# Patient Record
Sex: MALE | Race: WHITE
[De-identification: names, ages, dates, MRNs, and addresses within clinical notes are randomized per-mention and may not be internally consistent; named-entity substitution may affect disease eponyms.]

---

## 2018-01-13 ENCOUNTER — HOSPITAL ENCOUNTER (INPATIENT)
Dept: HOSPITAL 68 - ERH | Age: 23
LOS: 3 days | DRG: 351 | End: 2018-01-16
Attending: INTERNAL MEDICINE | Admitting: INTERNAL MEDICINE
Payer: COMMERCIAL

## 2018-01-13 DIAGNOSIS — M62.82: Primary | ICD-10-CM

## 2018-01-13 DIAGNOSIS — F20.9: ICD-10-CM

## 2018-01-13 DIAGNOSIS — Z91.14: ICD-10-CM

## 2018-01-13 DIAGNOSIS — E87.2: ICD-10-CM

## 2018-01-13 DIAGNOSIS — F12.959: ICD-10-CM

## 2018-01-13 LAB
ABSOLUTE GRANULOCYTE CT: 11.6 /CUMM (ref 1.4–6.5)
ABSOLUTE GRANULOCYTE CT: 17.4 /CUMM (ref 1.4–6.5)
BASOPHILS # BLD: 0.1 /CUMM (ref 0–0.2)
BASOPHILS # BLD: 0.1 /CUMM (ref 0–0.2)
BASOPHILS NFR BLD: 0.3 % (ref 0–2)
BASOPHILS NFR BLD: 0.4 % (ref 0–2)
EOSINOPHIL # BLD: 0 /CUMM (ref 0–0.7)
EOSINOPHIL # BLD: 0 /CUMM (ref 0–0.7)
EOSINOPHIL NFR BLD: 0 % (ref 0–5)
EOSINOPHIL NFR BLD: 0.1 % (ref 0–5)
ERYTHROCYTE [DISTWIDTH] IN BLOOD BY AUTOMATED COUNT: 12.6 % (ref 11.5–14.5)
ERYTHROCYTE [DISTWIDTH] IN BLOOD BY AUTOMATED COUNT: 12.8 % (ref 11.5–14.5)
GRANULOCYTES NFR BLD: 77.3 % (ref 42.2–75.2)
GRANULOCYTES NFR BLD: 85.5 % (ref 42.2–75.2)
HCT VFR BLD CALC: 37.5 % (ref 42–52)
HCT VFR BLD CALC: 42.6 % (ref 42–52)
LYMPHOCYTES # BLD: 1.3 /CUMM (ref 1.2–3.4)
LYMPHOCYTES # BLD: 1.7 /CUMM (ref 1.2–3.4)
MCH RBC QN AUTO: 29.7 PG (ref 27–31)
MCH RBC QN AUTO: 30 PG (ref 27–31)
MCHC RBC AUTO-ENTMCNC: 33.8 G/DL (ref 33–37)
MCHC RBC AUTO-ENTMCNC: 34.3 G/DL (ref 33–37)
MCV RBC AUTO: 87.6 FL (ref 80–94)
MCV RBC AUTO: 87.7 FL (ref 80–94)
MONOCYTES # BLD: 1.6 /CUMM (ref 0.1–0.6)
MONOCYTES # BLD: 1.6 /CUMM (ref 0.1–0.6)
PLATELET # BLD: 267 /CUMM (ref 130–400)
PLATELET # BLD: 328 /CUMM (ref 130–400)
PMV BLD AUTO: 7 FL (ref 7.4–10.4)
PMV BLD AUTO: 7.4 FL (ref 7.4–10.4)
RED BLOOD CELL CT: 4.28 /CUMM (ref 4.7–6.1)
RED BLOOD CELL CT: 4.86 /CUMM (ref 4.7–6.1)
WBC # BLD AUTO: 15 /CUMM (ref 4.8–10.8)
WBC # BLD AUTO: 20.3 /CUMM (ref 4.8–10.8)

## 2018-01-13 PROCEDURE — G0480 DRUG TEST DEF 1-7 CLASSES: HCPCS

## 2018-01-13 NOTE — ED PSYCHIATRIC COMPLAINT
History of Present Illness
 
General
Chief Complaint: Psychiatric Related Complaint
Stated Complaint: BIBA FOUND OUTSIDE IN UNDERWEAR, COMBATIVE
Source: EMS
Exam Limitations: confusion
Allergies
Coded Allergies:
tree nut (Severe, ANAPHYLAXIS 18)
 
Reconcile Medications
Risperidone (Risperdal) 1 MG TABLET   1 TAB PO DAILY MAYDA
 
Triage Nurses Notes Reviewed? yes
HPI:
22 year old male presents to the ER for agitation and confusion.  He was found 
in his underwear wandering the streets and told EMS he tried to smoke K47.   He 
appears confused, smiling, agitated, right subconjunctival hemorrhage.  He is 
unable to tell me what happened.   He also has abrasions to his right elbow and 
left 2nd toe. 
(Donny BECKETT,Amy)
 
Vital Signs & Intake/Output
Vital Signs & Intake/Output
 Vital Signs
 
 
Date Time Temp Pulse Resp B/P B/P Pulse O2 O2 Flow FiO2
 
     Mean Ox Delivery Rate 
 
 1340 97.6 84 18 137/84  100 Room Air  
 
 1114 98.0 97 18 150/89  99 Room Air  
 
 1113      99 Room Air  
 
 0900 98.0 74 18 130/77  99 Room Air  
 
 0700      99 Room Air  
 
 0700 96.9 76 18 147/81  99 Room Air  
 
 0559 98.2 105 20 170/99  100 Room Air  
 
 0020 98.8 110 18 156/82  97 Room Air  
 
 2043 98.7 108 18 167/96  100 Room Air  
 
 1833 98.2 100  146/98     
 
 1704 98.6 84 18 164/76  96 Room Air  
 
 
 ED Intake and Output
 
 
  0000  1200
 
Intake Total 3000 0
 
Output Total 1200 
 
Balance 1800 0
 
   
 
Intake, IV 3000 
 
Intake, Oral  0
 
Output, Urine 1200 
 
 
 
(Kacie BECKETT,Manjit RIDLEY)
 
Past History
 
Travel History
Traveled to [place] past 21 day No
 
Medical History
Any Pertinent Medical History? see below for history
Neurological: NONE
EENT: NONE
Cardiovascular: NONE
Respiratory: NONE
Gastrointestinal: NONE
Hepatic: NONE
Renal: NONE
Musculoskeletal: NONE
Psychiatric: anxiety, substance abuse
Endocrine: NONE
Blood Disorders: NONE
Cancer(s): NONE
GYN/Reproductive: NONE
History of MRSA: No
History of VRE: No
History of CDIFF: No
 
Surgical History
Surgical History: non-contributory
 
Psychosocial History
Who do you live with Family
What is your primary language English
 
Family History
Hx Contributory? No
(Amy Hines MD)
 
Review of Systems
 
Review of Systems
Constitutional:
Reports: see HPI (UNABLE TO OBTAIN). 
(Amy Hines MD)
 
Physical Exam
 
Physical Exam
General Appearance: well developed/nourished, alert, awake
Head: atraumatic, normal appearance
Eyes:
Bilateral: normal appearance, PERRL, EOMI. 
Ears, Nose, Throat: hearing grossly normal
Extremities: normal range of motion, RIGHT ELBOW ABRASION, LEFT TOE ABRASION
Neurological/Psychiatric: awake, alert, calm
Appearance/Memory/Insight: disheveled, impaired insight
Behavoir/Eye Contact/Speech: belligerent, uncooperative
Thoughts/Hallucinations: no apparent hallucination
Skin: intact, normal color, warm/dry
SAD PERSONS Done? UNOBTAINABLE
(Amy Hines MD)
 
Progress
Differential Diagnosis: PSYCHOSIS, SCHIZOPHRENIA, SCHIZOAFFECTIVE DISORDER
(Amy Hines MD)
Plan of Care:
 Orders
 
 
Procedure Date/time Status
 
CREATINE PHOSPHOKINASE 01/15 0600 Active
 
CBC WITHOUT DIFFERENTIAL 01/15 0600 Active
 
BASIC ELECTROLYTES PLUS BUN&CR 01/15 0600 Active
 
Regular Diet  L Active
 
Nothing by Mouth  B Complete
 
CREATINE PHOSPHOKINASE  2200 Active
 
CREATINE PHOSPHOKINASE  1600 Active
 
Restraint- Behavioral (Order)  1030 Active
 
Pathway - chart  1025 Active
 
House Staff  1025 Active
 
CREATINE PHOSPHOKINASE  1025 Complete
 
Patient Data  0804 Active
 
ED Holding Orders  0729 Active
 
Admit to inpatient  0729 Active
 
Vital Signs  0729 Active
 
Code Status  0729 Active
 
Restraint- Behavioral (Renew)  0700 Active
 
Villaseñor, Insertion/Removal/Asses  0635 Active
 
CULTURE,URINE  0635 Active
 
EKG  0635 Active
 
CULTURE,URINE  0632 Active
 
COMPREHENSIVE METABOLIC PANEL  0424 Complete
 
CREATINE PHOSPHOKINASE  0424 Complete
 
Continuous Observation Monitor  0410 Active
 
Restraint- Behavioral (Renew)  0300 Active
 
Continuous Observation Monitor  0010 Active
 
VTE Mechanical Prophylaxis   UNK Active
 
Restraint- Behavioral (Renew)  2245 Active
 
Restraint- Behavioral (Renew)  1845 Active
 
CBC WITHOUT DIFFERENTIAL  1756 Complete
 
BASIC METABOLIC PANEL  1756 Complete
 
Restraint- Behavioral (Order)  1444 Active
 
Intake & Output  0921 Active
 
 
 Current Medications
 
 
  Sig/Bianca Start time  Last
 
Medication Dose  Stop Time Status Admin
 
Acetaminophen 650 MG Q6P PRN  1030 AC 
 
(Tylenol)     
 
Sodium Chloride 1,000 ML .Q6H40M  1030 AC 
 
(Normal Saline 0.9%)     1252
 
Enoxaparin Sodium 40 MG DAILY  1023 AC 
 
(Lovenox)     
 
 
 Laboratory Tests
 
 
 
18 1113:
Creatine Kinase 5147  H
 
18 0430:
Anion Gap 19  H, Estimated GFR > 60, BUN/Creatinine Ratio 20.0, Glucose 151  H, 
Calcium 8.7, Total Bilirubin 0.9,   H, ALT 53, Alkaline Phosphatase 61, 
Creatine Kinase 6582  H, Total Protein 6.4, Albumin 4.0, Globulin 2.4, Albumin/
Globulin Ratio 1.7
 
18 1755:
Anion Gap 18  H, Estimated GFR > 60, BUN/Creatinine Ratio 26.7  H, Glucose 66, 
Calcium 8.4, CBC w Diff NO MAN DIFF REQ, RBC 4.28  L, MCV 87.7, MCH 29.7, RDW 
12.8, MPV 7.4, Gran % 77.3  H, Lymphocytes % 11.3  L, Monocytes % 10.9  H, 
Eosinophils % 0.1, Basophils % 0.4, Absolute Granulocytes 11.6  H, Absolute 
Lymphocytes 1.7, Absolute Monocytes 1.6  H, Absolute Eosinophils 0, Absolute 
Basophils 0.1, PUBS MCHC 33.8
 
18 1415:
Urine Opiates Screen < 100.00, Methadone Screen 59, Barbiturate Screen < 60, Ur 
Phencyclidine Scrn < 6.00, Amphetamines Screen < 100, U Benzodiazepines Scrn < 
85, Urine Cocaine Screen < 50, Urine Cannabis Screen 79.50  H
 Microbiology
 0635  URINE ROUT: Urine Culture - ORD
 0633  URINE ROUT: Urine Culture - RECD
 
9:08 AM
DISCUSSED CASE WITH ICU INTENSIVIST DR PEREZ WHO FEELS PATIENT CAN GO TO  WITH
A SAFETY MONITOR AS HE IS JUST RECEIVING FLUIDS, WAITING FOR PSYCHOSIS TO CLEAR.
 RESTING HR IN 70-80'S, EKG WNL.  HEAD/NECK IMAGING NEGATIVE.  WAITING FOR 
HOUSESTAFF TO EVALUATE PATIENT.
(Donny BECKETT,Amy)
Diagnostic Imaging:
Discussed w/RAD: CT Scan. 
Radiology Impression: PATIENT: ABBY MACIAS  MEDICAL RECORD NO: [id number] PRESENT
AGE: 22  PATIENT ACCOUNT NO: [id number] : 95  LOCATION: [facility] ORDERING 
PHYSICIAN: Manjit Hester MD     SERVICE DATE:  EXAM TYPE: CAT - 
CT CERV SPINE WO IV CONTRAST; CT HEAD WO IV CONTRAST EXAMINATION: NONCONTRAST 
HEAD CT NONCONTRAST CERVICAL SPINE CT INDICATION INFORMATION: Paranoid. Head 
trauma. Cervical spine trauma. COMPARISON: None TECHNIQUE: Separate noncontrast 
CT examinations of the head and cervical spine were performed. Coronal and 
sagittal images were created for each examination at the technologist 
workstation. DLP: 1055 mGy-cm FINDINGS: Head: There is no evidence of acute 
intracranial hemorrhage or territorial infarction. No abnormal mass effect or 
midline shift is seen. Gray to white matter differentiation is well preserved. 
No extra-axial fluid collections are identified. No hydrocephalus. No 
significant volume loss. There is no abnormal attenuation within the brain 
parenchyma. The osseous structures and soft tissues are normal. The mastoid air 
cells and visualized portions of the paranasal sinuses are well aerated. 
Cervical spine: There is anatomic alignment of the vertebral bodies and 
posterior elements. The atlantoaxial and atlantooccipital articulations are 
intact. Vertebral body heights and intervertebral disc spaces are maintained.  
No evidence of acute fracture. No prevertebral soft tissue swelling. Visualized 
portions of the lung apices are unremarkable. The thyroid gland is unremarkable.
IMPRESSION: 1. No acute intracranial findings. 2. Unremarkable appearance of the
cervical spine. DICTATED BY: Trey Louis MD  DATE/TIME DICTATED:18 :RAD.STEWART  DATE/TIME TRANSCRIBED:18 
CONFIDENTIAL, DO NOT COPY WITHOUT APPROPRIATE AUTHORIZATION.  <Electronically 
signed in Other Vendor System>                                                  
                                     SIGNED BY: Trey Louis MD 18 
0628
Comments:
2018 19:45:03 PM patient signed out to me by Dr. Hines at shift change 
over.  Patient resting comfortably.
 
2018 11:10:42 PM patient continues to show signs of hallucinations and 
delusions.
 
2018 4:18:29 AM patient continues to yell and show signs of severe 
agitation despite repeated doses of Zyprexa and other sedatives.
 
2018 5:54:31 AM patient's CK has elevated although fortunately his renal 
functions remains stable.  His acidosis has improved some.  Given his 
persistently agitated state I have ordered ketamine to facilitate a CAT scan of 
the head to rule out intracranial pathology or trauma and to facilitate IV 
fluids.  I feel the patient should be admitted given his worsening 
rhabdomyolysis.
 
2018 7:33:04 AM patient signed out to Dr. Hines at shift change over.  
Patient's head CAT scan reveals no acute abnormality.  Villaseñor catheter has been 
placed to facilitate intake and output recordings.  IV fluids have been ordered 
to maintain renal perfusion.
(Kacie BECKETT,Manjit RIDLEY)
 
Departure
 
Departure
Time of Disposition: 729
Disposition: STILL A PATIENT
Condition: Stable
Clinical Impression
Primary Impression: Rhabdomyolysis
Secondary Impressions: Agitation, Psychosis
Referrals:
Patient Has No Primary Care Dr (PCP/Family)
 
Departure Forms:
Customer Survey
General Discharge Information
 
Admission Note
Spoke With:
Noe Mendes MD
Documentation of Exam:
Documentation of any treatments & extenuating circumstances including Concerns 
Regarding Discharge (functional status, medication knowledge or non-compliance, 
living conditions, etc.) that warrant an admission rather than observation: [
RESTRAINTS, SEDATION, IV FLUIDS, RECHECK ELECTROLYTES, RECHECK CK, 1:1 SITTER, 
CRISIS EVALUATION WHEN MEDICALLY CLEARED, CASE DISCUSSED WITH DR PEREZ]
 
(Donny BECKETT,Amy)

## 2018-01-14 VITALS — SYSTOLIC BLOOD PRESSURE: 152 MMHG | DIASTOLIC BLOOD PRESSURE: 84 MMHG

## 2018-01-14 VITALS — DIASTOLIC BLOOD PRESSURE: 80 MMHG | SYSTOLIC BLOOD PRESSURE: 140 MMHG

## 2018-01-14 NOTE — RADIOLOGY REPORT
EXAMINATION:
XR PORTABLE CHEST
 
CLINICAL INFORMATION:
Altered mental status. Medication overdose. Evaluate for aspiration
pneumonitis.
 
COMPARISON:
None
 
TECHNIQUE:
Portable frontal view of the chest was obtained.
 
FINDINGS:
The lungs are well-inflated and clear. Trachea is midline in position.  No
evidence of interstitial lung disease, focal consolidation, mass or pleural
effusion.
 
The cardiomediastinal silhouette and hilar structures have normal size and
contour.
 
Bones of the thorax are unremarkable. The visualized upper abdomen is normal.
 
IMPRESSION:
No evidence of aspiration pneumonia.

## 2018-01-14 NOTE — HISTORY & PHYSICAL
Kavita BECKETT,[place] 01/14/18 0951:
General Information and HPI
MD Statement:
I have seen and personally examined ABBY MACIAS and documented this H&P.
 
The patient is a 22 year old M who presented with a patient stated chief 
complaint of [Agitation, confusion, psychotic behavior].
 
Source of Information: EMS, ER notes, father
Exam Limitations: unable to give history, confusion
History of Present Illness:
22 year old male with a remote history of schizophrenia not on medications 
presents to the ER for agitation and confusion. He was found in his underwear 
wandering the streets and told EMS he tried to smoke K47 (also known as pot-
pouri or spice on the streets). He appeared confused and was severely agitated 
requiring restraints in the ER. He is unable to contribute to the history. 
According to information gathered by ER staff, his father says he had become 
very stressed recently; had bought an expensive car but was having trouble 
making the payments. He lives with his parents, has a job and has been 
functioning well despite his history of schizophrenia without much issues until 
recently. It is unclear if he smokes, drinks alcohol or uses any other illicit 
medications.
 
In the ER he had to be medicated with ketamine to get a Head CT due to severe 
agitation not amenable to other medications.
 
Allergies/Medications
Allergies:
Coded Allergies:
tree nut (Severe, ANAPHYLAXIS 01/13/18)
 
Home Med list
Risperidone (Risperdal) 1 MG TABLET   1 TAB PO DAILY MAYDA
 
Compliance With Home Meds: UNKNOWN
 
Past History
 
Travel History
Traveled to [place] past 21 day No
 
Medical History
Neurological: NONE
EENT: NONE
Cardiovascular: NONE
Respiratory: NONE
Gastrointestinal: NONE
Hepatic: NONE
Renal: NONE
Musculoskeletal: NONE
Psychiatric: anxiety, substance abuse
Endocrine: NONE
Blood Disorders: NONE
Cancer(s): NONE
GYN/Reproductive: NONE
History of MRSA: No
History of VRE: No
History of CDIFF: No
Isolation History: Standard
 
Surgical History
Surgical History: non-contributory
 
Past Family/Social History
 
Psychosocial History
Where do you live? Home
Who Do You Live With? parent
Smoking Status: Unknown If Ever Smoked
ETOH Use: unknown
Illicit Drug Use: marijuana
 
Review of Systems
 
Review of Systems
Constitutional:
Reports: no symptoms. 
Comments
pt unable to participate in history due to AMS
 
Exam & Diagnostic Data
Last 24 Hrs of Vital Signs/I&O
 Vital Signs
 
 
Date Time Temp Pulse Resp B/P B/P Pulse O2 O2 Flow FiO2
 
     Mean Ox Delivery Rate 
 
01/14 1429  86  139/86     
 
01/14 1340 97.6 84 18 137/84  100 Room Air  
 
01/14 1114 98.0 97 18 150/89  99 Room Air  
 
01/14 1113      99 Room Air  
 
01/14 0900 98.0 74 18 130/77  99 Room Air  
 
01/14 0700      99 Room Air  
 
01/14 0700 96.9 76 18 147/81  99 Room Air  
 
01/14 0559 98.2 105 20 170/99  100 Room Air  
 
01/14 0020 98.8 110 18 156/82  97 Room Air  
 
01/13 2043 98.7 108 18 167/96  100 Room Air  
 
01/13 1833 98.2 100  146/98     
 
01/13 1704 98.6 84 18 164/76  96 Room Air  
 
 
 Intake & Output
 
 
 01/14 1600 01/14 0800 01/14 0000
 
Intake Total   3000
 
Output Total  600 1200
 
Balance  -600 1800
 
    
 
Intake, IV   3000
 
Output, Urine  600 1200
 
 
 
 
Physical Exam
General Appearance Lethargic and drowsy but arousable with voice and painful 
stimuli, confused
Skin bruise over left 2nd toes and erthematous rash over bilateral knees
Skin Temp/Moisture Exam: Warm/Dry
Sepsis Skin Exam (color): Normal for Ethnicity
HEENT Mucous Membr. moist/pink, small but reactive pupils, right conjunctival 
hemorrhage
Neck Supple, No JVD
Cardiovascular Normal S1, Normal S2, with missed beats
Lungs Clear to Auscultation, Normal Air Movement
Abdomen Normal Bowel Sounds, Soft, No Tenderness
Extremities No Edema, Normal Pulses
Last 24 Hrs of Labs/Wm:
 Laboratory Tests
 
01/14/18 1113:
Creatine Kinase 5147  H
 
01/14/18 0430:
Anion Gap 19  H, Estimated GFR > 60, BUN/Creatinine Ratio 20.0, Glucose 151  H, 
Calcium 8.7, Total Bilirubin 0.9,   H, ALT 53, Alkaline Phosphatase 61, 
Creatine Kinase 6582  H, Total Protein 6.4, Albumin 4.0, Globulin 2.4, Albumin/
Globulin Ratio 1.7
 
01/13/18 1755:
Anion Gap 18  H, Estimated GFR > 60, BUN/Creatinine Ratio 26.7  H, Glucose 66, 
Calcium 8.4, CBC w Diff NO MAN DIFF REQ, RBC 4.28  L, MCV 87.7, MCH 29.7, RDW 
12.8, MPV 7.4, Gran % 77.3  H, Lymphocytes % 11.3  L, Monocytes % 10.9  H, 
Eosinophils % 0.1, Basophils % 0.4, Absolute Granulocytes 11.6  H, Absolute 
Lymphocytes 1.7, Absolute Monocytes 1.6  H, Absolute Eosinophils 0, Absolute 
Basophils 0.1, PUBS MCHC 33.8
 Microbiology
01/14 0635  URINE ROUT: Urine Culture - CAN
                Cancelled: DUPLICATE - SEE 
01/14 0633  URINE ROUT: Urine Culture - RECD
 
 
 
Diagnostic Data
EKG Results
SR-90; inferior q waves, non-specific t-wave abnormalities
 
Assessment/Plan
Assessment:
22 year old male with a remote history of schizophrenia not on medications 
presents to the ER for agitation and confusion. He was found in his underwear 
wandering the streets and told EMS he tried to smoke K47 (also known as pot-
pouri or spice on the streets). He appeared confused and was severely agitated 
requiring restraints in the ER. 
 
 
1. Psychosis with AMS likely from sedation
2. Rhabdomyolysis-eleveted CK possibly from severe agitation
3. Remote hx of schizophrenia
 
Plan
1. Admit to GM
2. IVF hydration with D5 1/2 NS
3. Keep NPO for now
4. 1:1 sitter
5. trend CK Q6 hrs
6. CXR to rule aspiration pnuemonitis
7. Aspiration precaution
8. Psych consult in AM
9. Follow attending recommendations
10. DVT ppx-with lovenox
11. FC
12. Mild pain pathway; avoid opiods/sedatives
 
As Ranked By This Provider
Problem List:
 1. Psychosis
 
 2. Rhabdomyolysis
 
 
Core Measures/Misc (9/17)
 
Acute Coronary Syndrome
ACS Diagnosis: No
 
Congestive Heart Failure
Congestive Heart Failure Diagnosis No
 
Cerebrovascular Accident
CVA/TIA Diagnosis: No
 
VTE (View Protocol)
VTE Risk Factors Acute Medical Illness
No Mechanical VTE Prophylaxis d/t N/A MechProphylax Ordered
No VTE Pharm Prophylaxis d/t NA PharmProphylax ordered
 
Sepsis (View protocol)
Sepsis Present: No
 
Resident Review Statement
Resident Statement: examined this patient
Other Findings:
see KIA Mendes MD,Noe 01/14/18 1406:
Attending MD Review Statement
 
Attending Statement
Attending MD Statement: examined this patient, discuss w/resident/PA/NP, agreed 
w/resident/PA/NP, reviewed EMR data (avail), discussed with nursing, discussed 
with case mgmt, amended to note
Attending Assessment/Plan:
Patient seen and examined.  History mostly obtained from ER staff.  He is a 22-
year-old male with history of schizophrenia reports of poor compliance with 
therapy was brought in after he was found wandering the streets in his 
underwear.  He had reportedly smoked a medication called K47.  Is unclear what 
this medication is.  Staff report that it is synthetic marijuana. An online 
search suggests it may be a combination of cocaine and ketamine.  His urine 
toxicology was positive only for marijuana which he apparently smokes as well.
He was brought into the emergency room for evaluation yesterday morning.  He was
hallucinating and delirious and required several doses of sedatives.  Yesterday 
in the emergency room he got a total of Ativan 6 mg IV, Benadryl 100 mg Haldol 
10 mg and Zyprexa 10 mg.  This morning he was again agitated and received 
Zyprexa 10 mg as well as 50 mg of ketamine.  He was restrained on 4 points.
He is found with rhabdomyolysis with CPK level of over 6000 yesterday.  Levels 
had improved to 5147 at which point he was referred to the inpatient medical 
service for further management of his rhabdomyolysis in addition to his 
psychosis.
 
 
General appearance: Very sedated but responsive.
HEENT: Anicteric, no pallor right conjunctiva hemorrhage.
Neurologic: Four-point restraints in place.  Answers questions appropriately 
when awake but falls back asleep.  Able to protect airway.  Pupils equal and 
reactive.  No nystagmus.  Extraocular muscles intact.
Heart: S1-S2 regular
Lungs: Good entry bilaterally, clear to auscultation
Abdomen: Flat, soft, nontender with normal bowel sounds
Extremities: No pedal edema
 
Head CT today shows no acute pathologies.
 
Problems:
1.  Acute medication induced psychosis.;  In patient with underlying 
schizophrenia and poorly compliant with therapy.
2. Rhabdomyolysis 
3.  Leukocytosis; likely reactive
4.  Anion gap acidosis; likely secondary to starvation 
 
plan:.
-Admit to inpatient General medical service.
-Aggressive IV hydration with D5 half normal saline at 1 25 cc/h.  2 L.
-Psychiatric consultation.
-Close monitoring for de-escalation of restraints per nursing protocol.-
-Maintain aspiration precautions at all times.  Hold all sedatives when patient 
is drowsy.
-Keep n.p.o. until patient is significantly more alert.
-No clinical evidence of infection are present.  Recommend obtaining chest x-ray
to rule out aspiration pneumonitis.
-DVT prophylaxis with heparin subcu.

## 2018-01-15 VITALS — SYSTOLIC BLOOD PRESSURE: 140 MMHG | DIASTOLIC BLOOD PRESSURE: 60 MMHG

## 2018-01-15 VITALS — DIASTOLIC BLOOD PRESSURE: 70 MMHG | SYSTOLIC BLOOD PRESSURE: 152 MMHG

## 2018-01-15 VITALS — SYSTOLIC BLOOD PRESSURE: 144 MMHG | DIASTOLIC BLOOD PRESSURE: 70 MMHG

## 2018-01-15 LAB
ABSOLUTE GRANULOCYTE CT: 5.8 /CUMM (ref 1.4–6.5)
BASOPHILS # BLD: 0 /CUMM (ref 0–0.2)
BASOPHILS NFR BLD: 0.5 % (ref 0–2)
EOSINOPHIL # BLD: 0.1 /CUMM (ref 0–0.7)
EOSINOPHIL NFR BLD: 1.6 % (ref 0–5)
ERYTHROCYTE [DISTWIDTH] IN BLOOD BY AUTOMATED COUNT: 13.2 % (ref 11.5–14.5)
GRANULOCYTES NFR BLD: 65.2 % (ref 42.2–75.2)
HCT VFR BLD CALC: 38.1 % (ref 42–52)
LYMPHOCYTES # BLD: 2 /CUMM (ref 1.2–3.4)
MCH RBC QN AUTO: 30.3 PG (ref 27–31)
MCHC RBC AUTO-ENTMCNC: 34.5 G/DL (ref 33–37)
MCV RBC AUTO: 87.9 FL (ref 80–94)
MONOCYTES # BLD: 0.9 /CUMM (ref 0.1–0.6)
PLATELET # BLD: 276 /CUMM (ref 130–400)
PMV BLD AUTO: 7.5 FL (ref 7.4–10.4)
RED BLOOD CELL CT: 4.34 /CUMM (ref 4.7–6.1)
WBC # BLD AUTO: 8.8 /CUMM (ref 4.8–10.8)
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
## 2018-01-15 NOTE — PN- HOUSESTAFF
**See Addendum**
Subjective
Follow-up For:
Rhabdomyolysis
Subjective:
Patient was seen and examined, sitter at bedside, vitals are stable. No 
complaints. Alert, orinated x 3. denies pain. Has laceration of the right elbow 
and sub-conjunctiva hemorrhage of the right eye however no pain and vision is 
intact. 
 
Review of Systems
Constitutional:
Reports: see HPI. 
 
Objective
Last 24 Hrs of Vital Signs/I&O
 Vital Signs
 
 
Date Time Temp Pulse Resp B/P B/P Pulse O2 O2 Flow FiO2
 
     Mean Ox Delivery Rate 
 
01/15 0630 98.1 91 18 144/70  97 Room Air  
 
 2247 99.1 106 19 152/84  98 Room Air  
 
 1604 98.0 70 20 140/80  98   
 
 1429  86  139/86     
 
 1340 97.6 84 18 137/84  100 Room Air  
 
 1114 98.0 97 18 150/89  99 Room Air  
 
 1113      99 Room Air  
 
 
 Intake & Output
 
 
 01/15 1600 01/15 0800 01/15 0000
 
Intake Total  900 800
 
Output Total  800 750
 
Balance  100 50
 
    
 
Intake, IV  800 400
 
Intake, Oral  100 400
 
Number  0 
 
Bowel   
 
Movements   
 
Output, Urine  800 750
 
 
 
 
Physical Exam
General Appearance: Alert, Oriented X3, Cooperative, No Acute Distress
Skin: No Rashes, No Breakdown, No Significant Lesion
Skin Temp/Moisture Exam: Warm/Dry
HEENT: Atraumatic, PERRLA, EOMI, Mucous Membr. moist/pink
Neck: Supple
Cardiovascular: Regular Rate, Normal S1, Normal S2, No Murmurs
Lungs: Clear to Auscultation, Normal Air Movement
Abdomen: Normal Bowel Sounds, Soft, No Tenderness
Neurological: Normal Speech, Strength at 5/5 X4 Ext, Normal Tone, Sensation 
Intact, Cranial Nerves 3-12 NL, Reflexes 2+
Extremities: No Clubbing, No Cyanosis, No Edema, Normal Pulses
 
Assessment/Plan
Assessment:
22 year old male with a remote history of schizophrenia not on medications 
presents to the ER for agitation and confusion. He was found in his underwear 
wandering the streets and told EMS he tried to smoke K47 (also known as pot-
pouri or spice on the streets). He appeared confused and was severely agitated 
requiring restraints in the ER. 
 
 
1. Psychosis with AMS likely from sedation
2. Rhabdomyolysis-eleveted CK possibly from severe agitation
3. Remote hx of schizophrenia
 
Plan
1.  Continue IV hydration with 1 25 mL/h
2.  Will keep patient nothing by mouth since he still drowsy, and we will assess
later in the day and to start diet if appropriate
3. 1:1 sitter
4.  Keep the Villaseñor catheter for now
5. CK is improving 2264 today, will repeat in a.m.
6. CXR was obtained to rule out aspiration pneumonia since the patient presented
with white blood cell 20 however when down to 8.8 mostly reactive
7.  Urine culture remained negative
8. Psych consult in AM
9. Follow attending recommendations pending 
10. DVT ppx-with lovenox
11. FC
12. Mild pain pathway; avoid opiods/sedatives
 
Problem List:
 1. Rhabdomyolysis
 
Pain Ratin
Pain Location:
N/A
Pain Goal: Pain 4 or less
Pain Plan:
See medication 
Tomorrow's Labs & Rationales:
CK

## 2018-01-16 VITALS — SYSTOLIC BLOOD PRESSURE: 128 MMHG | DIASTOLIC BLOOD PRESSURE: 90 MMHG

## 2018-01-16 VITALS — DIASTOLIC BLOOD PRESSURE: 70 MMHG | SYSTOLIC BLOOD PRESSURE: 114 MMHG

## 2018-01-16 NOTE — INCDNTL NT PSY
Incidental Note
Notation:
The patient has an intale appointment for psychiatry and counseling at [facility], 
[address] [place] [address], [phone], on Monday, 1/22/18, at 9:30 AM.

## 2018-01-16 NOTE — PN- PSYCHIATRY
Assessment/Plan
Impression:
Thepatient millyins some rigid thinking about psychiatric treatment for his 
anxiety. He wants to be assured that he will be assigned a prescriber who will 
give him what he wants, namely Xanax. He has been getting this from a friend in 
the past. He is not currently open to first-line medications, such as SSRI, and 
he is not willing to admit he has a cannabis use problem.
 
He is agreeable to an intake appointment with [facility]; we are awaiting a return 
call.
 
TC with his mother, Sirisha, today: She visited last night and thinks that the 
patient is at his baseline, and is willing to take him back home. She is in 
agreement that the patient should be in a program. she mentions that she is 
trying to get him an appointment at [facility] ([facility]). I 
disclosed to her (and the patient a few minutes later) that I am employed at 
[facility] [employer] part-time, but that would not prevent the patient from coming there, and if
he does not wish to see me, there are other providers.
 
The patient agreed to meet with myself and the [facility] medical student for 
the interview today. After the interview, he stated that he only wished to meet 
alone with this writer in the future, here in the hospital.
Suggestion:
1. We will advise the patient of his intake appointment at [facility] for therapy 
and medication management for anxiety and substance use disorder. We are 
expecting a return call.
 
2. Instruct the patient to avoid alcohol, cannabis, synthetic marijuana and 
other street/recreational drugs.
 
The patient is not suicidal, not psychotic, not delirious, and is cleared 
psychiatrically for discharge.
 
Signed THOM releases for [facility] and the patient's parents are in the chart.
 
Thank you for this consult. Psychiatry is signing off, but will advise patient 
of his intake appointment.
 
 
Subjective
Subjective:
Alert and oriented. Denies AH or VH, and presents no alis delusions. He denies 
SI or HI. Insight into his condition, including drug use and anxiety, possibly 
substance-induced, is limited. Judgement is moderate. He reportshe feels safe 
here.
 
Objective
Last 24 Hrs of Vital Signs/I&O
 Vital Signs
 
 
Date Time Temp Pulse Resp B/P B/P Pulse O2 O2 Flow FiO2
 
     Mean Ox Delivery Rate 
 
01/16 0619 98.2 86 20 114/70  97 Room Air  
 
01/15 2240 98.9 82 18 152/70  95   
 
01/15 1426 98.0 93 18 140/60  97 Room Air  
 
 
 Intake & Output
 
 
 01/16 1600 01/16 0800 01/16 0000
 
Intake Total  1600 980
 
Output Total   
 
Balance  1600 980
 
    
 
Intake, IV  1000 500
 
Intake, Oral  600 480
 
 
 
Physical Exam:
Not performed.
 
Physical Exam
General Appearance: alert, awake
Neurologic/Psychiatric: alert, oriented x 3
Current Medications:
 Current Medications
 
 
  Sig/Bianca Start time  Last
 
Medication Dose Route Stop Time Status Admin
 
Acetaminophen 650 MG Q6P PRN 01/14 1030 AC 01/15
 
  PO   0246
 
Dextrose/Lactated  1,000 ML Q8H 01/14 1615 DC 01/16
 
Ringer's  IV   0204
 
Enoxaparin Sodium 40 MG DAILY 01/14 1023 AC 01/16
 
  SC   0752
 
Ramelteon 8 MG .STK-MED ONE 01/16 0011 DC 
 
  PO 01/16 0012  
 
Ramelteon 8 MG ONCE ONE 01/15 2345 DC 01/16
 
  PO 01/15 2346  0012
 
 
 
 
Results
Last 24 Hrs of Labs/Mics:
 Laboratory Tests
 
 
 01/16
 
 0819
 
Chemistry 
 
  Sodium (137 - 145 mmol/L) 144
 
  Potassium (3.5 - 5.1 mmol/L) 4.0
 
  Chloride (98 - 107 mmol/L) 106
 
  Carbon Dioxide (22 - 30 mmol/L) 27
 
  Anion Gap (5 - 16) 11
 
  BUN (9 - 20 mg/dL) 7  L
 
  Creatinine (0.7 - 1.2 mg/dL) 0.8
 
  Estimated GFR (>60 ml/min) > 60
 
  BUN/Creatinine Ratio (7 - 25 %) 8.8
 
  Creatine Kinase (55 - 170 U/L) 1181  H

## 2018-01-16 NOTE — DISCHARGE SUMMARY
Visit Information
 
Visit Dates
Admission Date:
01/14/18
 
Discharge Date:
01/16/18
 
 
Hospital Course
 
Course
Attending Physician:
Cecilia Torre MD
 
Primary Care Physician:
Patient Has No Primary Care Dr
 
Hospital Course:
Mr. Yeung is 22 year old male with past medical history significant for 
anxiety, psychosis and polysubstance abuse who presented to ED after he was 
found wandering the streets in his underwear.  He had reportedly smoked a 
medication called K47 which is either ketamine and cocaine combination or 
synthetic marijuana.  CT head and cervical spine was unremarkable, chest x-ray 
within normal findings.
Urine toxicology was positive only for marijuana.  Patient was initially 
hallucinating and delirious which required multiple doses of sedatives: Ativan, 
Haldol and Benadryl in addition to 10 mg of zepraxa and 50 mg of ketamine and 
finally he was restrained in 4 points heart strain.
Patient was found to have rhabdomyolysis with CPK level over 6000, was started 
on aggressive IV fluid resuscitation, creatinine kinase was measured daily and 
improved to 1000 on discharge.  Patient denied any body pain, chest pain, 
shortness of breath, palpitation.  Kidney function remained normal.  He also was
found to have anion gap acidosis secondary to starvation that improved with 
fluid resuscitation.
Patient was discharged after medical and psychiatric clearance.  He is supposed 
to follow up with psychiatric outpatient on Monday 1/22/18. 
[facility], [address] [place] [address], [phone], on Monday, 1/22/18, at 9:
30 AM.
 
 
 
Allergies:
Coded Allergies:
tree nut (Severe, ANAPHYLAXIS 01/13/18)
 
 
Disposition Summary
 
Disposition
Principal Diagnosis:
Rhabdomyolysis
Additional Diagnosis:
Drug induced psychosis
Discharge Disposition: home or self care
 
Discharge Instructions
 
General Discharge Information
Code Status: Full Code
Patient's Diet:
Regular
Patient's Activity:
As tolerated
Follow-Up Instructions/Appts:
-Please follow up with your PCP within one week after discharge.
-Please follow up with your appointment at [facility] for therapy and medication 
management for anxiety and substance use disorder
Monday 1/22/18 at 9:30 in am and address is [address] [place] [address] 
[phone]
 
-Please avoid alcohol, cannabis, synthetic marijuana and other street/
recreational drugs.
 
Copies To:
Miya BECKETT,Markell Grove MD Review Statement
Other Findings:
Patient was discussed with psychiatry and was cleared from psych stand for 
discharge home.

## 2018-01-16 NOTE — PATIENT DISCHARGE INSTRUCTIONS
**See Addendum**
Discharge Instructions
 
General Discharge Information
Special Instructions:
-Please follow up with your PCP within one week after discharge.
-Please follow up with your appointment at [facility] for therapy and medication 
management for anxiety and substance use disorder.  
-Please avoid alcohol, cannabis, synthetic marijuana and other street/
recreational drugs.
 
Acute Coronary Syndrome
 
Inclusion Criteria
At DC or during hospital stay patient has or had the following:
ACS DIAGNOSIS No
 
Discharge Core Measures
Meds if any: Prescribed or Continued at Discharge
Meds if any: NOT Prescribed or Continued at Discharge
 
Congestive Heart Failure
 
Inclusion Criteria
At DC or during hospital stay patient has or had the following:
CHF DIAGNOSIS No
 
Discharge Core Measures
Meds if any: Prescribed or Continued at Discharge
Meds if any: NOT Prescribed or Continued at Discharge
 
Cerebrovascular accident
 
Inclusion Criteria
At DC or during hospital stay patient has or had the following:
CVA/TIA Diagnosis No
 
Discharge Core Measures
Meds if any: Prescribed or Continued at Discharge
Meds if any: NOT Prescribed or Continued at Discharge
 
Venous thromboembolism
 
Inclusion Criteria
VTE Diagnosis No
VTE Type NONE
VTE Confirmed by (Test) NONE
 

## 2018-01-16 NOTE — PN- HOUSESTAFF
Lio BECKETT,[facility] 18 0844:
Subjective
Follow-up For:
Rhabdomyolysis
Subjective:
Patient was seen and examined this morning, vitals stable. Patient is more awake
today. I asked him if he has any pain, he reported "pain in my arm", "I do not 
know where, I think it is gone". Kinf of confused with pressure talk. No 
overnight reports for agitation. Was evaluated by psych yesterday.
Patient wants to be discharged today, he was told to wait for psych 
recommendation. 
 
 
Review of Systems
Constitutional:
Reports: see HPI. 
 
Objective
Last 24 Hrs of Vital Signs/I&O
 Vital Signs
 
 
Date Time Temp Pulse Resp B/P B/P Pulse O2 O2 Flow FiO2
 
     Mean Ox Delivery Rate 
 
 0619 98.2 86 20 114/70  97 Room Air  
 
01/15 2240 98.9 82 18 152/70  95   
 
01/15 1426 98.0 93 18 140/60  97 Room Air  
 
 
 Intake & Output
 
 
  1600  0800  0000
 
Intake Total  1600 980
 
Output Total   
 
Balance  1600 980
 
    
 
Intake, IV  1000 500
 
Intake, Oral  600 480
 
 
 
 
Physical Exam
General Appearance: Alert, Cooperative, No Acute Distress
Skin: laceration over the right elbow 
Skin Temp/Moisture Exam: Warm/Dry
HEENT: Atraumatic, PERRLA, EOMI, Mucous Membr. moist/pink
Neck: Supple
Cardiovascular: Regular Rate, Normal S1, Normal S2, No Murmurs
Lungs: Clear to Auscultation, Normal Air Movement
Abdomen: Normal Bowel Sounds, Soft, No Tenderness
Neurological: Normal Speech, Strength at 5/5 X4 Ext, Normal Tone, Sensation 
Intact, Cranial Nerves 3-12 NL, Reflexes 2+
Extremities: No Clubbing, No Cyanosis, No Edema, Normal Pulses
 
Assessment/Plan
Assessment:
22 year old male with a remote history of schizophrenia not on medications 
presents to the ER for agitation and confusion. He was found in his underwear 
wandering the streets and told EMS he tried to smoke K47 (also known as pot-
pouri or spice on the streets). He appeared confused and was severely agitated 
requiring restraints in the ER. 
 
 
1. Psychosis with AMS likely from sedation
2. Rhabdomyolysis-eleveted CK possibly from severe agitation
3. Remote hx of schizophrenia
 
Plan
1.  Continue IV hydration with 1 25 mL/h
2.  Psych evaluation was obtained yesterday with recommendation to keep him in 
hospital, they will offer me inpatient admission today.
3. 1:1 sitter
4.  Villaseñor catheter removed yesterday, diet was started as well
5. CK is improving gradually, today level is pending
6. CXR was obtained to rule out aspiration pneumonia since the patient presented
with white blood cell 20 however when down to 8.8 mostly reactive
7.  Urine culture remained negative
 
DVT ppx-with lovenox
FC
Diet regular
Mild pain pathway; avoid opiods/sedatives
 
Problem List:
 1. Rhabdomyolysis
 
Pain Ratin
Pain Location:
n/a
Pain Goal: Pain 4 or less
Pain Plan:
see medication 
Tomorrow's Labs & Rationales:
EZRA Torre MD,Cecilia 18 1229:
Attending MD Review Statement
 
Attending Statement
Attending MD Statement: examined this patient, discuss w/resident/PA/NP, agreed 
w/resident/PA/NP, reviewed EMR data (avail), discussed with nursing, discussed 
with case mgmt, amended to note
Attending Assessment/Plan:
Patient seen and examined, more awake today and feels overall better. CK levels 
are coming down. 
Vital Signs
 
 
Date Time Temp Pulse Resp B/P B/P Pulse O2 O2 Flow FiO2
 
     Mean Ox Delivery Rate 
 
 0619 98.2 86 20 114/70  97 Room Air  
 
01/15 2240 98.9 82 18 152/70  95   
 
01/15 1426 98.0 93 18 140/60  97 Room Air  
 
 
on exam; 
aox3, nad. 
cv; s1,s2, rrr
resp; clear
abd; soft, nt, bs+
ext; no edema. 
 
 Laboratory Tests
 
 
 
 
 0819
 
Chemistry 
 
  Sodium (137 - 145 mmol/L) 144
 
  Potassium (3.5 - 5.1 mmol/L) 4.0
 
  Chloride (98 - 107 mmol/L) 106
 
  Carbon Dioxide (22 - 30 mmol/L) 27
 
  Anion Gap (5 - 16) 11
 
  BUN (9 - 20 mg/dL) 7  L
 
  Creatinine (0.7 - 1.2 mg/dL) 0.8
 
  Estimated GFR (>60 ml/min) > 60
 
  BUN/Creatinine Ratio (7 - 25 %) 8.8
 
  Creatine Kinase (55 - 170 U/L) 1181  H
 
 
A/P; Patient is a 22-year-old male with remote history of schizophrenia, not on 
any medications admitted with altered mental state, rhabdomyolysis.
CK levels improved. Will DC IVfs. 
Patient seen by Psych today. Has been cleared by psych to go home. 
Please clarify with psychiatry about continuing Risperdal.
If patient cleared from psych standpoint, he is medically stable for discharge 
home today.
Outpatient follow-up plan as noted in the psychiatry note.

## 2018-04-07 ENCOUNTER — HOSPITAL ENCOUNTER (INPATIENT)
Dept: HOSPITAL 68 - ERH | Age: 23
LOS: 4 days | DRG: 776 | End: 2018-04-11
Attending: INTERNAL MEDICINE | Admitting: INTERNAL MEDICINE
Payer: COMMERCIAL

## 2018-04-07 VITALS — HEIGHT: 72 IN | WEIGHT: 139 LBS | BODY MASS INDEX: 18.83 KG/M2

## 2018-04-07 DIAGNOSIS — F12.259: Primary | ICD-10-CM

## 2018-04-07 DIAGNOSIS — M62.82: ICD-10-CM

## 2018-04-07 DIAGNOSIS — E87.2: ICD-10-CM

## 2018-04-07 DIAGNOSIS — I10: ICD-10-CM

## 2018-04-07 DIAGNOSIS — F19.959: ICD-10-CM

## 2018-04-07 DIAGNOSIS — R00.0: ICD-10-CM
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
## 2018-04-07 DIAGNOSIS — F41.9: ICD-10-CM

## 2018-04-07 DIAGNOSIS — F12.221: ICD-10-CM

## 2018-04-07 DIAGNOSIS — Z91.018: ICD-10-CM

## 2018-04-07 LAB
ABSOLUTE GRANULOCYTE CT: 8.1 /CUMM (ref 1.4–6.5)
BASOPHILS # BLD: 0 /CUMM (ref 0–0.2)
BASOPHILS NFR BLD: 0.4 % (ref 0–2)
EOSINOPHIL # BLD: 0 /CUMM (ref 0–0.7)
EOSINOPHIL NFR BLD: 0.1 % (ref 0–5)
ERYTHROCYTE [DISTWIDTH] IN BLOOD BY AUTOMATED COUNT: 13.1 % (ref 11.5–14.5)
GRANULOCYTES NFR BLD: 68.1 % (ref 42.2–75.2)
HCT VFR BLD CALC: 43.9 % (ref 42–52)
LYMPHOCYTES # BLD: 2.6 /CUMM (ref 1.2–3.4)
MCH RBC QN AUTO: 29.5 PG (ref 27–31)
MCHC RBC AUTO-ENTMCNC: 33.4 G/DL (ref 33–37)
MCV RBC AUTO: 88.2 FL (ref 80–94)
MONOCYTES # BLD: 1.1 /CUMM (ref 0.1–0.6)
PLATELET # BLD: 378 /CUMM (ref 130–400)
PMV BLD AUTO: 7.3 FL (ref 7.4–10.4)
RED BLOOD CELL CT: 4.98 /CUMM (ref 4.7–6.1)
WBC # BLD AUTO: 11.8 /CUMM (ref 4.8–10.8)

## 2018-04-07 PROCEDURE — G0480 DRUG TEST DEF 1-7 CLASSES: HCPCS

## 2018-04-08 VITALS — SYSTOLIC BLOOD PRESSURE: 140 MMHG | DIASTOLIC BLOOD PRESSURE: 80 MMHG

## 2018-04-08 VITALS — DIASTOLIC BLOOD PRESSURE: 70 MMHG | SYSTOLIC BLOOD PRESSURE: 140 MMHG

## 2018-04-08 VITALS — SYSTOLIC BLOOD PRESSURE: 147 MMHG | DIASTOLIC BLOOD PRESSURE: 94 MMHG

## 2018-04-08 LAB
ABSOLUTE GRANULOCYTE CT: 6.3 /CUMM (ref 1.4–6.5)
BASOPHILS # BLD: 0.1 /CUMM (ref 0–0.2)
BASOPHILS NFR BLD: 0.6 % (ref 0–2)
EOSINOPHIL # BLD: 0 /CUMM (ref 0–0.7)
EOSINOPHIL NFR BLD: 0.1 % (ref 0–5)
ERYTHROCYTE [DISTWIDTH] IN BLOOD BY AUTOMATED COUNT: 12.9 % (ref 11.5–14.5)
GRANULOCYTES NFR BLD: 65.1 % (ref 42.2–75.2)
HCT VFR BLD CALC: 36.2 % (ref 42–52)
LYMPHOCYTES # BLD: 2.3 /CUMM (ref 1.2–3.4)
MCH RBC QN AUTO: 29.4 PG (ref 27–31)
MCHC RBC AUTO-ENTMCNC: 33.5 G/DL (ref 33–37)
MCV RBC AUTO: 87.7 FL (ref 80–94)
MONOCYTES # BLD: 1.1 /CUMM (ref 0.1–0.6)
PLATELET # BLD: 268 /CUMM (ref 130–400)
PMV BLD AUTO: 7.1 FL (ref 7.4–10.4)
RED BLOOD CELL CT: 4.13 /CUMM (ref 4.7–6.1)
WBC # BLD AUTO: 9.7 /CUMM (ref 4.8–10.8)
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
## 2018-04-08 NOTE — CONS- PSYCHIATRY
Psychiatric Consult
Date of Consult: 04/08/18
Reason for Consult:
"psychosis and agitation"
History of Present Illness:
Pt with hx of one episode of psychosis leading to hospitalization now with 
second admission to medicine for delirium, psychosis, and agitation in the 
setting of likely substance use. Pt given versed, ativa, haldo, thorazine, and 
benadryl for severe agitation. On interview today, barely arousable. Did state 
when asked about the details leading to hospitalization that he is "eating 
dinner at home." Kept falling asleep. 
 
Per father, Juan Diego, [phone]: After [facility] stay in 2016, pt took risperidone 0.5mg
for a few weeks and then discontinued. No psychotic sx since then. Had episode 
in Jan similar to today which took medical care and time to resolve. Since that 
time, things have been going well, pt has enrolled in college and got a new car.
He has been looking for a job. Father denies ANY psychotic sx at all.  As to 
current presentation, he was vomiting on Friday and acting "a little weird." 
Yesterday, more confused and losing things. They went to his car and found money
on [facility]. There was also money in the car and pt couldnt say why. Pt 
started vomiting again and parents convinced him to come in to be checked out. 
Father smelled mj on son a few weeks ago and spoke with him about it. They went 
through his stuff today and found some. They are saving it in case it needs to 
be tested for adulterations. Pt has not expressed any thoughts of harm to self 
or others. Parents have no safety concerns, pt has been doing well recently. 
 
 
 
Allergies:
Coded Allergies:
tree nut (Severe, ANAPHYLAXIS 01/13/18)
 
Current Medications:
 Current Medications
 
 
  Sig/Bianca Start time  Last
 
Medication Dose Route Stop Time Status Admin
 
Chlorpromazine 50 MG ONCE ONE 04/07 2345 DC 04/08
 
  IM 04/07 2346  0113
 
Chlorpromazine 100 MG ONCE ONE 04/07 2330 CAN 
 
  IM 04/07 2331  
 
Cyanocobalamin/ 1 BAG Q24H 04/09 0300 AC 
 
Thiamine/Pyridoxine  IV   
 
Sodium Chloride 1,000 ML    
 
Cyanocobalamin/ 1 BAG ONCE ONE 04/08 0330 DC 04/08
 
Thiamine/Pyridoxine  IV 04/08 1129  0537
 
Sodium Chloride 1,000 ML    
 
Dextrose/Sodium  1,000 ML Q8H 04/08 1145 UNVr 
 
Chloride  IV 04/09 0344  
 
Diphenhydramine HCl 0 .STK-MED ONE 04/07 2159 DC 
 
  .ROUTE   
 
Diphenhydramine HCl 50 MG ONCE ONE 04/07 2130 DC 04/07
 
  IV 04/07 2131 2203
 
Diphenhydramine HCl 25 MG ONCE ONE 04/07 2045 DC 04/07
 
  IM 04/07 2046 2045
 
Diphenhydramine HCl 0 .STK-MED ONE 04/07 2044 DC 
 
  .ROUTE   
 
Haloperidol 5 MG ONCE ONE 04/07 2045 DC 04/07
 
  IM 04/07 2046 2045
 
Haloperidol 0 .STK-MED ONE 04/07 2044 DC 
 
  .ROUTE   
 
Heparin Sodium  5,000 UNIT Q8 04/08 0600 AC 04/08
 
(Porcine)  SC   0537
 
Lorazepam 0 .STK-MED ONE 04/08 0000 DC 
 
  .ROUTE   
 
Lorazepam 2 MG ONE ONE 04/07 2330 DC 04/08
 
  IV 04/07 2331  0010
 
Lorazepam 0 .STK-MED ONE 04/07 2159 DC 
 
  .ROUTE   
 
Lorazepam 2 MG ONE ONE 04/07 2130 DC 04/07
 
  IV 04/07 2131 2203
 
Lorazepam 0 .STK-MED ONE 04/07 2045 DC 
 
  .ROUTE   
 
Lorazepam 2 MG ONCE ONE 04/07 2045 DC 04/07
 
  IM 04/07 2046 2045
 
Midazolam HCl 2 MG Q4 PRN 04/08 0800 AC 
 
  IV   
 
Midazolam HCl 5 MG ONCE ONE 04/08 0430 DC 04/08
 
  IV 04/08 0431  0457
 
Midazolam HCl 5 MG ONCE ONE 04/08 0200 DC 04/08
 
  IV 04/08 0201  0210
 
Olanzapine 10 MG ONCE ONE 04/08 0200 DC 04/08
 
  IM 04/08 0201  0155
 
Olanzapine 0 .STK-MED ONE 04/08 0158 DC 
 
  IM   
 
Sodium Chloride 1,000 ML Q10H 04/08 0330 DC 04/08
 
  IV   0407
 
Sodium Chloride 1,000 ML BOLUS ONE 04/07 2130 DC 04/07
 
  IV 04/07 2229  2203
 
Sodium Chloride 1,000 ML BOLUS ONE 04/07 2130 DC 04/07
 
  IV 04/07 2229  2203
 
Thiamine HCl 0 .STK-MED ONE 04/08 0443 DC 
 
  .ROUTE   
 
 
 
 
Past History
 
Past Medical History
Neurological: NONE
EENT: NONE
Cardiovascular: NONE
Respiratory: NONE
Gastrointestinal: NONE
Hepatic: NONE
Renal: NONE
Musculoskeletal: NONE
Psychiatric: anxiety, substance abuse, Psychotic d/o, NOS in 2016, requiring 
psychiatry admission.
Endocrine: NONE
Blood Disorders: NONE
Cancer(s): NONE
GYN/Reproductive: NONE
 
Past Surgical History
Surgical History: non-contributory
 
Psychosocial History
Strengths/Capabilities:
supportive family
Physical Limitations (Interventions):
The patient reports chronic back pain, of unknown etiology
 
Psychiatric Treatment History
Psych Treatment
   Psychiatric Treatment Yes
   Inpatient Treatment Yes ([facility])
Diagnosis:
Previously:
 F29 Unspecified psychotic d/o
 F12.20 Cannabis use d/o, severe
 F10.20 Alcohol use d/o, mild
Risk Factors: age (under 24/over 65), high anxiety/distress, SA/MH hospitalized,
substance abuse, poor impulse control, male
 
Substance Use/Abuse History
Drug Use/Abuse
   Substances Used/Abused Yes
   Substance Used/Abused Marijuana (unclear others)
 
Substance Abuse Treatment
Substance Abuse Treatment
   Past Substance Abuse [place] No
 
Assessment/Plan
 
Mental Status
Orientation: Confused
Affect: Anxious (irritable)
Speech: Slurred
Neuro-vegetative: Sleep Disturbance
Mental Status Exam:
MSE
General appearance: fair hygiene and grooming; 
Attitude: not cooperative;
Eye contact: none; 
Movement: ++ psychomotor slowing; 
Speech: slurred; 
Mood: "(I was eating dinner)"
Affect: irritable, inappropriate, constricted, labile, congruent; 
Thought process: confused
Thought content: did not express any SI or HI, no paranoid ideation noted; 
Perception: did not express any hallucinations- auditory, visual, does not 
appear to be responding to internal stimuli; 
I/J: limited
Lab Results:
 Laboratory Tests
 
 
 04/08 04/08 04/08
 
 0850 0500 0039
 
Chemistry   
 
  Sodium (137 - 145 mmol/L)  140 
 
  Potassium (3.5 - 5.1 mmol/L)  4.5 
 
  Chloride (98 - 107 mmol/L)  108  H 
 
  Carbon Dioxide (22 - 30 mmol/L)  20  L 
 
  Anion Gap (5 - 16)  12 
 
  BUN (9 - 20 mg/dL)  23  H 
 
  Creatinine (0.7 - 1.2 mg/dL)  0.9 
 
  Estimated GFR (>60 ml/min)  > 60 
 
  Glucose (65 - 99 mg/dL)  83 
 
  Lactic Acid (0.7 - 2.1 mmol/L) < 0.5  L  
 
  Calcium (8.4 - 10.2 mg/dL)  8.7 
 
  Phosphorus (2.5 - 4.5 mg/dL)  4.1 
 
  Magnesium (1.6 - 2.3 mg/dL)  2.1 
 
  Total Bilirubin (0.2 - 1.3 mg/dL)  1.0 
 
  AST (17 - 59 U/L)  35 
 
  ALT (21 - 72 U/L)  52 
 
  Creatine Kinase (55 - 170 U/L) 1183  H  
 
  Albumin (3.5 - 5.0 g/dL)  3.8 
 
  Vitamin B12 (239 - 931 pg/mL) 484  
 
Hematology   
 
  CBC w Diff  NO MAN DIFF REQ 
 
  WBC (4.8 - 10.8 /CUMM)  9.7 
 
  RBC (4.70 - 6.10 /CUMM)  4.13  L 
 
  Hgb (14.0 - 18.0 G/DL)  12.1  L 
 
  Hct (42 - 52 %)  36.2  L 
 
  MCV (80.0 - 94.0 FL)  87.7 
 
  MCH (27.0 - 31.0 PG)  29.4 
 
  MCHC (33.0 - 37.0 G/DL)  33.5 
 
  RDW (11.5 - 14.5 %)  12.9 
 
  Plt Count (130 - 400 /CUMM)  268 
 
  MPV (7.4 - 10.4 FL)  7.1  L 
 
  Gran % (42.2 - 75.2 %)  65.1 
 
  Lymphocytes % (20.5 - 51.1 %)  23.3 
 
  Monocytes % (1.7 - 9.3 %)  10.9  H 
 
  Eosinophils % (0 - 5 %)  0.1 
 
  Basophils % (0.0 - 2.0 %)  0.6 
 
  Absolute Granulocytes (1.4 - 6.5 /CUMM)  6.3 
 
  Absolute Lymphocytes (1.2 - 3.4 /CUMM)  2.3 
 
  Absolute Monocytes (0.10 - 0.60 /CUMM)  1.1  H 
 
  Absolute Eosinophils (0.0 - 0.7 /CUMM)  0 
 
  Absolute Basophils (0.0 - 0.2 /CUMM)  0.1 
 
Toxicology   
 
  Urine Opiates Screen (>2000 NG/ML)   < 100
 
  Methadone Screen (>300 NG/ML)   67
 
  Barbiturate Screen (>200 NG/ML)   < 60
 
  Ur Phencyclidine Scrn (>25 NG/ML)   < 6.00
 
  Amphetamines Screen (>1000 NG/ML)   178
 
  U Benzodiazepines Scrn (>200 NG/ML)   < 85
 
  Urine Cocaine Screen (>300 NG/ML)   < 50
 
  Urine Cannabis Screen (>50 NG/ML)   76.10  H
 
 
 
 
 04/07 04/07 2110 2039
 
Chemistry  
 
  Sodium (137 - 145 mmol/L)  137
 
  Potassium (3.5 - 5.1 mmol/L)  3.9
 
  Chloride (98 - 107 mmol/L)  98
 
  Carbon Dioxide (22 - 30 mmol/L)  19  L
 
  Anion Gap (5 - 16)  20  H
 
  BUN (9 - 20 mg/dL)  24  H
 
  Creatinine (0.7 - 1.2 mg/dL)  1.1
 
  Estimated GFR (>60 ml/min)  > 60
 
  BUN/Creatinine Ratio (7 - 25 %)  21.8
 
  Glucose (65 - 99 mg/dL)  170  H
 
  Calcium (8.4 - 10.2 mg/dL)  9.7
 
  Phosphorus (2.5 - 4.5 mg/dL)  4.3
 
  Magnesium (1.6 - 2.3 mg/dL)  1.9
 
  Total Bilirubin (0.2 - 1.3 mg/dL)  1.1
 
  AST (17 - 59 U/L)  26
 
  ALT (21 - 72 U/L)  69
 
  Alkaline Phosphatase (< 127 U/L)  80
 
  Creatine Kinase (55 - 170 U/L) Cancelled 153
 
  Total Protein (6.3 - 8.2 g/dL)  8.6  H
 
  Albumin (3.5 - 5.0 g/dL)  5.1  H
 
  Globulin (1.9 - 4.2 gm/dL)  3.5
 
  Albumin/Globulin Ratio (1.1 - 2.2 %)  1.5
 
  Lipase (23 - 300 U/L)  34
 
  TSH &T3 &Free T4 Intrp (0.27 - 4.20 uIU/mL)  2.180
 
Hematology  
 
  CBC w Diff  NO MAN DIFF REQ
 
  WBC (4.8 - 10.8 /CUMM)  11.8  H
 
  RBC (4.70 - 6.10 /CUMM)  4.98
 
  Hgb (14.0 - 18.0 G/DL)  14.7
 
  Hct (42 - 52 %)  43.9
 
  MCV (80.0 - 94.0 FL)  88.2
 
  MCH (27.0 - 31.0 PG)  29.5
 
  MCHC (33.0 - 37.0 G/DL)  33.4
 
  RDW (11.5 - 14.5 %)  13.1
 
  Plt Count (130 - 400 /CUMM)  378
 
  MPV (7.4 - 10.4 FL)  7.3  L
 
  Gran % (42.2 - 75.2 %)  68.1
 
  Lymphocytes % (20.5 - 51.1 %)  21.9
 
  Monocytes % (1.7 - 9.3 %)  9.5  H
 
  Eosinophils % (0 - 5 %)  0.1
 
  Basophils % (0.0 - 2.0 %)  0.4
 
  Absolute Granulocytes (1.4 - 6.5 /CUMM)  8.1  H
 
  Absolute Lymphocytes (1.2 - 3.4 /CUMM)  2.6
 
  Absolute Monocytes (0.10 - 0.60 /CUMM)  1.1  H
 
  Absolute Eosinophils (0.0 - 0.7 /CUMM)  0
 
  Absolute Basophils (0.0 - 0.2 /CUMM)  0
 
Toxicology  
 
  Serum Alcohol (<10 MG/DL)  < 10.0
 
 
 
Diffential Diagnosis:
Substance-induced psychosis
Cyclic vomiting syndrome
Unspecified psychosis
Cannabis dependence
 
Impression:
Pt with one time hx of psychosis likely representative of substance-induced 
given no recurrent sx  despite medication non-compliance who was at baseline 
until two days ago and started vomiting. Vomiting may be secondary to CVS given 
is known side effect of mj use, particularly heavy use. This may have triggered 
delirium. In addition, mj may have been laced with another substance or pt may 
have used something like K2 which can cause agitation, confusion, delirium, 
agression and which does not show up on utox. Again, delirium and agitation 
should resolve with withdrawal of offending agent. Pts family does have sample 
of the weed in case it does not or complications arise. 
 
- Needs 1:1 given delirium
- Pt currently sedated with versed
- May use thorazine or zyprexa for agitation, please monitor BPs as concurrent 
BDZ use and QTCs
- Delirium does take time to resolve even in young pts
 
Thank you for the consult.
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
## 2018-04-09 VITALS — DIASTOLIC BLOOD PRESSURE: 73 MMHG | SYSTOLIC BLOOD PRESSURE: 140 MMHG

## 2018-04-09 VITALS — DIASTOLIC BLOOD PRESSURE: 57 MMHG | SYSTOLIC BLOOD PRESSURE: 119 MMHG

## 2018-04-09 VITALS — DIASTOLIC BLOOD PRESSURE: 70 MMHG | SYSTOLIC BLOOD PRESSURE: 138 MMHG

## 2018-04-09 VITALS — DIASTOLIC BLOOD PRESSURE: 72 MMHG | SYSTOLIC BLOOD PRESSURE: 136 MMHG

## 2018-04-09 VITALS — SYSTOLIC BLOOD PRESSURE: 100 MMHG | DIASTOLIC BLOOD PRESSURE: 78 MMHG

## 2018-04-09 VITALS — DIASTOLIC BLOOD PRESSURE: 68 MMHG | SYSTOLIC BLOOD PRESSURE: 124 MMHG

## 2018-04-09 VITALS — SYSTOLIC BLOOD PRESSURE: 138 MMHG | DIASTOLIC BLOOD PRESSURE: 78 MMHG

## 2018-04-09 VITALS — SYSTOLIC BLOOD PRESSURE: 116 MMHG | DIASTOLIC BLOOD PRESSURE: 52 MMHG

## 2018-04-09 VITALS — SYSTOLIC BLOOD PRESSURE: 130 MMHG | DIASTOLIC BLOOD PRESSURE: 68 MMHG

## 2018-04-09 LAB
ABSOLUTE GRANULOCYTE CT: 5.2 /CUMM (ref 1.4–6.5)
BASOPHILS # BLD: 0.1 /CUMM (ref 0–0.2)
BASOPHILS NFR BLD: 0.6 % (ref 0–2)
EOSINOPHIL # BLD: 0.1 /CUMM (ref 0–0.7)
EOSINOPHIL NFR BLD: 1 % (ref 0–5)
ERYTHROCYTE [DISTWIDTH] IN BLOOD BY AUTOMATED COUNT: 12.8 % (ref 11.5–14.5)
GRANULOCYTES NFR BLD: 62.1 % (ref 42.2–75.2)
HCT VFR BLD CALC: 35.8 % (ref 42–52)
LYMPHOCYTES # BLD: 2.3 /CUMM (ref 1.2–3.4)
MCH RBC QN AUTO: 30.4 PG (ref 27–31)
MCHC RBC AUTO-ENTMCNC: 34.5 G/DL (ref 33–37)
MCV RBC AUTO: 88.1 FL (ref 80–94)
MONOCYTES # BLD: 0.7 /CUMM (ref 0.1–0.6)
PLATELET # BLD: 221 /CUMM (ref 130–400)
PMV BLD AUTO: 7.6 FL (ref 7.4–10.4)
RED BLOOD CELL CT: 4.06 /CUMM (ref 4.7–6.1)
WBC # BLD AUTO: 8.3 /CUMM (ref 4.8–10.8)
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
## 2018-04-09 NOTE — PN- RESIDENT CRCU
**See Addendum**
Subjective
HPI/CRCU Issues:
I followed up and examined the patient today.  He is resting comfortably in bed,
has a Villaseñor catheter, does not appear to be in any distress, is oriented to time
place and person, and was concerned about eating, and speaking to his parents.
 
He passed a bedside swallow screen that I conducted herself, and I restarted his
oral diet.
 
When his father visited him earlier this morning, he was agitated and anxious, 
was almost pulling out his lines, and calmed down only after his father left and
he had been given versed.
 
 
 
Objective
 
Vital Signs & I&O
Last 8 Hrs of Vitals and I&O:
Vital Signs
 
 
Date Time Temp Pulse Resp B/P B/P Pulse O2 O2 Flow FiO2
 
     Mean Ox Delivery Rate 
 
 1200 98.4 82 18 138/78     
 
 1200      97 Room Air  
 
 0800      99 Room Air Room Air 
 
 0800 98.9 97 19 116/52  99 Room Air  
 
 0000 100.3 18 18 140/73  97 Room Air  
 
 1600 98.1 78 16 140/70  97 Room Air Room Air 
 
 1600      94 Room Air Room Air 
 
 
 
 
Exam
General Appearance: well developed/nourished, no apparent distress, alert, awake
, anxious
Head: atraumatic, normal appearance
Neck: normal inspection, supple, full range of motion
Respiratory: normal breath sounds, chest non-tender, no respiratory distress
Cardiovascular: regular rate/rhythm
Gastrointestinal: normal bowel sounds, soft, non-tender
Extremities: normal inspection, normal capillary refill, normal range of motion,
no edema
Cranial Nerves: grossly intact physically, Psychiatry: could not assess fully, 
as the patient was aggressive at times
Skin: intact, normal color, warm/dry
Skin Temp/Moisture Exam: Warm/Dry
Sepsis Skin Exam (color): Normal for Ethnicity
Back: normal inspection
Sepsis Peripheral Pulse Location: Radial
Sepsis Peripheral Pulse Exam: Normal
Sepsis Cap Refill Exam: <2 Sec
Current Medications:
 Current Medications
 
 
  Sig/Bianca Start time  Last
 
Medication Dose Route Stop Time Status Admin
 
Cyanocobalamin/ 1 BAG Q24H  0300 AC 
 
Thiamine/Pyridoxine  IV   0536
 
Sodium Chloride 1,000 ML    
 
Dextrose/Sodium  1,000 ML Q8H  1145 DC 
 
Chloride  IV  1024  1930
 
Folic Acid 1 MG DAILY 04/10 1000 UNVr 
 
  PO   
 
Heparin Sodium  5,000 UNIT Q8  0600 AC 
 
(Porcine)  SC   0536
 
Midazolam HCl 5 MG .STK-MED ONE  0129 DC 
 
  IM  0130  
 
Midazolam HCl 5 MG .STK-MED ONE  2112 DC 
 
  IM 2113  
 
Midazolam HCl 2 MG Q4 PRN  0800 AC 
 
  IV   0922
 
Multivitamins 1 TAB DAILY 04/10 1000 UNVr 
 
  PO   
 
Sodium Chloride 1,000 ML Q6H  1115 AC 
 
  IV   1200
 
Thiamine HCl 100 MG DAILY 04/10 1000 UNVr 
 
  PO   
 
 
 
 
Impression/Plan
 
Impression/Problem List
Impression:
22-year-old male with past history of polysubstance abuse, anxiety, psychosis 
including admission to Inpatient Psychiatry, and noncompliance to medication/
follow-ups, he was brought to the emergency for abnormal behavior compared to 
his baseline.
In the ICU, he was found to have belligerent behavior, with shaking episodes, 
subsequently with high CPK and not sure whether he only had marijuana or had 
something laced with it, he was admitted to the ICU for close observation.  
Initially, he was obtunded to the point where he responded only after a sternal 
rub.
 
He is currently in the ICU for management of following issues:
 
# Altered mental status, likely secondary to polysubstance abuse
At this time, patient has had waxing and waning altered mental status, U tox 
negative for opiates but positive only for marijuana.  However other forms of 
medications could also be abused which are not commonly tested in the routine U 
tox.  He has a history of using ketamine as one of the drugs of abuse, which 
could also apparently be laced with marijuana to give a mixed picture.  His 
vitals have remained stable so far, and he has received benzodiazepines, given 
his condition and also his history of benzodiazepines use in the past to avoid 
withdrawal.
* Continue to monitor in ICU for now, with plans to transfer to general medical 
floor when more stable
* Continue benzodiazepines
* CIWA protocol has been added to monitor him for alcohol withdrawal symptoms
* Continue with IV banana bag for today, and switched to by mouth folate/
thiamine/multivitamin starting tomorrow.
* Continue repleting electrolytes as necessarY
* Following psychiatry service's recommendations
* Have requested  consultation as well, will follow
 
Patient's condition can also relate fairly to neuroleptic malignant syndrome, 
thus will watch for any changes in that direction. If he has the syndrome, will 
be treated as psychiatry has suggested earlier today. Patient as well as his 
parents well explained about the differential diagnosis and that we are watching
for it. All questions answered. 
 
#Rhabdomyolysis
Patient was found to have rhabdomyolysis with high anion gap metabolic acidosis 
on arrival, with lactic acid and ketones within normal limits but CPK on the 
rising trend.  He is being adequately fluid resuscitated, and his kidney 
functions have remained stable all this time.
* Continue with IV fluids aggressively
* Continue monitoring CPK daily
 
#Diet: Patient passed a bedside swallow screen this morning, and is having 
regular diet.
#DVT ppx: SQ Heparin
#Code status: Full code
 
------------------------------
UPDATE: 1715 HRS: Patient's parents visited him, and I could clearly see the 
patient getting agitated and anxious more around his parents to the point that 
he pulled his telemetry connector and wanted to leave. HE CANNOT LEAVE AGAINST 
MEDICAL ADVICE, UNTIL CLEARED BY PSYCHIATRY SERVICE. PSYCHIATRY CONSULT EARLIER 
MENTIONED THAT THE PATIENT CANNOT LEAVE AMA. Night team signed out about the 
update.
-----------------------------
Problem List:
 1. Rhabdomyolysis
 
 2. Delirium due to dissociative drug
 
Pain Ratin
Pain Location:
-
Pain Goal: Pain 4 or less
Pain Plan:
prn
Tomorrow's Labs & Rationales:
ICU bundle
 
Plan
DVT/Prophylaxis: mechanical, pharmacological

## 2018-04-10 VITALS — SYSTOLIC BLOOD PRESSURE: 136 MMHG | DIASTOLIC BLOOD PRESSURE: 72 MMHG

## 2018-04-10 VITALS — DIASTOLIC BLOOD PRESSURE: 70 MMHG | SYSTOLIC BLOOD PRESSURE: 146 MMHG

## 2018-04-10 VITALS — DIASTOLIC BLOOD PRESSURE: 84 MMHG | SYSTOLIC BLOOD PRESSURE: 132 MMHG

## 2018-04-10 VITALS — SYSTOLIC BLOOD PRESSURE: 140 MMHG | DIASTOLIC BLOOD PRESSURE: 88 MMHG

## 2018-04-10 VITALS — DIASTOLIC BLOOD PRESSURE: 70 MMHG | SYSTOLIC BLOOD PRESSURE: 128 MMHG

## 2018-04-10 VITALS — SYSTOLIC BLOOD PRESSURE: 146 MMHG | DIASTOLIC BLOOD PRESSURE: 82 MMHG

## 2018-04-10 VITALS — DIASTOLIC BLOOD PRESSURE: 88 MMHG | SYSTOLIC BLOOD PRESSURE: 150 MMHG

## 2018-04-10 LAB
ABSOLUTE GRANULOCYTE CT: 3.7 /CUMM (ref 1.4–6.5)
BASOPHILS # BLD: 0 /CUMM (ref 0–0.2)
BASOPHILS NFR BLD: 0.6 % (ref 0–2)
EOSINOPHIL # BLD: 0.2 /CUMM (ref 0–0.7)
EOSINOPHIL NFR BLD: 2.6 % (ref 0–5)
ERYTHROCYTE [DISTWIDTH] IN BLOOD BY AUTOMATED COUNT: 12.6 % (ref 11.5–14.5)
GRANULOCYTES NFR BLD: 54 % (ref 42.2–75.2)
HCT VFR BLD CALC: 35.5 % (ref 42–52)
LYMPHOCYTES # BLD: 2.3 /CUMM (ref 1.2–3.4)
MCH RBC QN AUTO: 29.9 PG (ref 27–31)
MCHC RBC AUTO-ENTMCNC: 33.9 G/DL (ref 33–37)
MCV RBC AUTO: 88.3 FL (ref 80–94)
MONOCYTES # BLD: 0.6 /CUMM (ref 0.1–0.6)
PLATELET # BLD: 263 /CUMM (ref 130–400)
PMV BLD AUTO: 7.1 FL (ref 7.4–10.4)
RED BLOOD CELL CT: 4.02 /CUMM (ref 4.7–6.1)
WBC # BLD AUTO: 6.9 /CUMM (ref 4.8–10.8)

## 2018-04-10 NOTE — PN- RESIDENT CRCU
Arvind BECKETT,Velasquez 04/10/18 0709:
Subjective
HPI/CRCU Issues:
Patient is in the ICU with ? Multiple substance abuse for close observation.
 
I followed up and examined the patient today.  He is resting comfortably in bed,
does not appear to be in distress, although he says he gets a little anxious at 
times, has 1:1 sitter in the room. Today, he seems to be calm, not agitated at 
all compared to yesterday jeannette evening. CIWA scores minimal. 
His Versed was changed to lorazepam yesterday as PRN. He only required it once 
last night and he also got Rozarem last night for insomnia.
 
 
Objective
 
Vital Signs & I&O
Last 8 Hrs of Vitals and I&O:
 Intake & Output
 
 
 04/10 0800
 
Intake Total 1450
 
Output Total 550
 
Balance 900
 
  
 
Intake, IV 1200
 
Intake, Oral 250
 
Output, Urine 550
 
 
 
 
Exam
General Appearance: well developed/nourished, no apparent distress, alert, awake
, comfortable
Other Physical Findings:
Head: atraumatic, normal appearance
Neck: normal inspection, supple, full range of motion
Respiratory: normal breath sounds, chest non-tender, no respiratory distress
Cardiovascular: regular rate/rhythm
Gastrointestinal: normal bowel sounds, soft, non-tender
Extremities: normal inspection, normal capillary refill, normal range of motion,
no edema
Cranial Nerves: grossly intact physically
Psychiatry: patient is calm, oriented and cooperative, has insight to his 
medical condition when explained, but not towards his drug abuse, agreeable to 
the plan of medical management
Skin: intact, normal color, warm/dry
Sepsis Skin Exam (color): Normal for Ethnicity
Back: normal inspection
 
Current Medications:
 Current Medications
 
 
  Sig/Bianca Start time  Last
 
Medication Dose Route Stop Time Status Admin
 
Cyanocobalamin/ 1 BAG Q24H  0300 DC 
 
Thiamine/Pyridoxine  IV  2200  0536
 
Sodium Chloride 1,000 ML    
 
Dextrose/Sodium  1,000 ML Q8H  1145 DC 
 
Chloride  IV  1024  1930
 
Folic Acid 1 MG DAILY 04/10 1000 AC 
 
  PO   
 
Heparin Sodium  5,000 UNIT Q8  0600 AC 04/10
 
(Porcine)  SC   0503
 
Lorazepam 2 MG Q6-PRN PRN  2300 AC 
 
  IV   2304
 
Midazolam HCl 5 MG .STK-MED ONE  2231 DC 
 
  IM  2232  
 
Midazolam HCl 5 MG .STK-MED ONE  1715 DC 
 
  IM  1716  
 
Midazolam HCl 5 MG .STK-MED ONE  1409 DC 
 
  IM  1410  
 
Midazolam HCl 5 MG .STK-MED ONE  0917 DC 
 
  IM  0918  
 
Midazolam HCl 2 MG Q4 PRN  0800 DC 
 
  IV   2244
 
Multivitamins 1 TAB DAILY 04/10 1000 AC 
 
  PO   
 
Nicotine 2 MG Q2 HRS AS NEEDED PRN  2300 AC 
 
  PO   2319
 
Nicotine 14 MG DAILY  1800 AC 
 
  TOP   1911
 
Ramelteon 8 MG ONCE ONE 04/10 0030 DC 04/10
 
  PO 04/10 0031  0022
 
Sodium Chloride 1,000 ML Q6H  1115 AC 04/10
 
  IV   0503
 
Thiamine HCl 100 MG DAILY 04/10 1000 AC 
 
  PO   
 
 
 
 
Impression/Plan
 
Impression/Problem List
Impression:
22-year-old male with past history of polysubstance abuse, anxiety, psychosis 
including admission to Inpatient Psychiatry, and noncompliance to medication/
follow-ups, he was brought to the emergency for abnormal behavior compared to 
his baseline.
In the ICU, he was found to have belligerent behavior, with "shaking" episodes, 
subsequently with high CPK and not sure whether he only had marijuana or had 
something laced with it, he was admitted to the ICU for close observation.  
Initially, he was obtunded to the point where he responded only after a sternal 
rub. he is much alert and oriented today.
 
He is currently in the ICU for management of following issues:
 
# Altered mental status, likely secondary to polysubstance abuse
At this time, patient has had waxing and waning altered mental status, U tox 
negative for opiates but positive only for marijuana.  However other forms of 
medications could also be abused which are not commonly tested in the routine U 
tox.  He has a history of using ketamine as one of the drugs of abuse, which 
could also apparently be laced with marijuana to give a mixed picture.  His 
vitals have remained stable so far, and he has received benzodiazepines, given 
his condition and also his history of benzodiazepines use in the past to avoid 
withdrawal.
* Transfer to general medical floor
* Continue benzodiazepines as PO Ativan PRN and IV PRN
* CIWA scores are NOT suggestive of alcohol withdrawal
* Continue PO folate/thiamine/multivitamin
* Continue repleting electrolytes as necessary
* Following psychiatry service's recommendations, discussed briefly today about 
his progress today
* Have requested  consultation as well, will follow probably later 
today
 
#Rhabdomyolysis
Patient was found to have rhabdomyolysis with high anion gap metabolic acidosis 
on arrival, with lactic acid and ketones within normal limits but CPK is slowly 
tending down today at 1048 from 1255 yesterday. He is being adequately fluid 
resuscitated, and his kidney functions have remained stable all this time.
* Continue with IV fluids aggressively now at 150 ml/hr
* Continue monitoring CPK daily
 
#Diet: Regular diet
#DVT ppx: SQ Heparin
#Code status: Full code
 
 
Problem List:
 1. Substance abuse
 
 2. Rhabdomyolysis
 
 3. Agitation
 
Pain Ratin
Pain Location:
-
Pain Goal: Pain 4 or less
Pain Plan:
prn
Tomorrow's Labs & Rationales:
ICU bundle, CPK
 
Plan
DVT/Prophylaxis: mechanical, pharmacological
 
 
Tyler Tse MD 04/10/18 7582:
Attending MD Review Statement
 
Attending Sign Off
Attending Cosign Statement:
I have: examined this patient, reviewed \[facility]\"" EMR data, discussd w/resident/PA/
NP, discussed mgmt plan w/pt, agreed w/resident/PA/NP, amended to note. 
Other Findings:
The patient was seen and discussed with house staff. Not agitated at the time of
my visit. Ativan prn. CK decreasing. OK to transfer to general medical floor. 
Continue IV fluids and follow CK. Psych follow-up.

## 2018-04-11 NOTE — PN- RESIDENT CRCU
Lebron Caraballo 04/11/18 0832:
Subjective
HPI/CRCU Issues:
Currently stable.  Vitals remained stable.  No SI/HI.  CIWA scores low.He has 
received 3 doses of lorazepam 2 mg in the last 24 hours. CIWA as of 4/10 @ 2000:
6-6-1-6-0-0-7-0-0
24 Hour Events:
 
Temperature 98.9, pulse rate 63, respirations 16, blood pressure 146/82, 96% on 
room air.
No overnight events reported.  He reported to have been on one-to-one sitter.
 
Objective
 
Vital Signs & I&O
Last 8 Hrs of Vitals and I&O:
 Intake & Output
 
 
 04/11 1600
 
Intake Total 
 
Output Total 
 
Balance 
 
  
 
Patient 139 lb
 
Weight 
 
 
 
 
Exam
General Appearance: alert
Other Physical Findings:
 
General Exam: AAOx3, No acute distress, Skin: No rashes, no breakdown;HEENT: 
PERRLA, EOMI;Neck: Supple, No JVD; No cervical lymphadenopathy;CVS: Reg Rate, 
Normal S1,S2, No MGR;Resp: Normal air entry, no ronchi/rales;Abdomen: Soft, No 
tenderness, Normal Bowel Sounds;Neuro: Normal Speech, Strength 5/5 b/l x 4 
extremities, Sensation intact, CN III-XII NL, Reflexes 2+;Extremities: No 
cyanosis, no pedal edema
 
 
 
Current Medications:
 Current Medications
 
 
  Sig/Bianca Start time  Last
 
Medication Dose Route Stop Time Status Admin
 
Folic Acid 1 MG DAILY 04/10 1000 DCD 04/11
 
  PO   1200
 
Heparin Sodium  5,000 UNIT Q8 04/08 0600 DCD 04/10
 
(Porcine)  SC   0503
 
Lorazepam 2 MG TID PRN 04/10 0845 DC 04/10
 
  PO   1858
 
Lorazepam 2 MG Q6-PRN PRN 04/09 2300 DC 04/10
 
  IV   2250
 
Multivitamins 1 TAB DAILY 04/10 1000 DCD 04/11
 
  PO   1200
 
Nicotine 2 MG Q2 HRS AS NEEDED PRN 04/09 2300 DCD 04/10
 
  PO   1801
 
Nicotine 14 MG DAILY 04/09 1800 DCD 04/11
 
  TOP   1200
 
Ramelteon 8 MG QPM 04/10 2200 DCD 04/10
 
  PO   2250
 
Sodium Chloride 1,000 ML Q6H 04/09 1115 DCD 04/11
 
  IV   1206
 
Thiamine HCl 100 MG DAILY 04/10 1000 DCD 04/11
 
  PO   1200
 
 
 
 
Impression/Plan
 
Impression/Problem List
Impression:
 
22-year-old male with past history of polysubstance abuse, anxiety, psychosis 
including admission to Inpatient Psychiatry, and noncompliance to medication/
follow-ups, he was brought to the emergency for abnormal behavior compared to 
his baseline.
 
Pertinent labs in the last 24 hours:
 
Sodium 140, potassium 3.8, bicarbonate 23, BUN 11, creatinine 0.7.  Magnesium 
1.5.  Creatinine kinase 584.
 
 
He is currently in the ICU for management of following issues:
 
# Altered mental status, likely secondary to polysubstance abuse
At this time, patient has had waxing and waning altered mental status, U tox 
negative for opiates but positive only for marijuana.  However other forms of 
medications could also be abused which are not commonly tested in the routine U 
tox.  He has a history of using ketamine as one of the drugs of abuse, which 
could also apparently be laced with marijuana to give a mixed picture.  His 
vitals have remained stable so far, and he has received benzodiazepines, given 
his condition and also his history of benzodiazepines use in the past to avoid 
withdrawal.
* Transfer to general medical floor
* Discontinue benzodiazepines.
* CIWA scores are NOT suggestive of alcohol withdrawal
* Continue PO folate/thiamine/multivitamin
* No history of cardiac disease.  Recheck magnesium when he sees his primary 
care physician.
* Following psychiatry service's recommendations, discussed briefly today about 
his progress today, who recommended a possible discharge, with close follow-up 
as an outpatient.
 
#Rhabdomyolysis
Patient was found to have rhabdomyolysis with high anion gap metabolic acidosis 
on arrival, with lactic acid and ketones within normal limits but CPK is slowly 
tending down today at 1048 from 1255 yesterday.  .  Which is trending 
down.
 
 
#Diet: Regular diet
#DVT ppx: SQ Heparin
#Code status: Full code
 
Problem List:
 1. Substance abuse
 
 2. Rhabdomyolysis
 
 3. Agitation
 
Pain Rating: 3
Tomorrow's Labs & Rationales:
no labs necessary
 
Plan
DVT/Prophylaxis: mechanical, pharmacological
 
 
Tyler Tse MD 04/11/18 2220:
Attending MD Review Statement
 
Attending Sign Off
Attending Cosign Statement:
I have: examined this patient, reviewed [facility] EMR data, discussd w/resident/PA/
NP, agreed w/resident/PA/NP, amended to note. 
Other Findings:
The patient was seen and discussed with house staff and psychiatry. CK decreased
(500's). OK to discharge to home today.

## 2018-04-11 NOTE — PN- PSYCHIATRY
Assessment/Plan
Impression:
The patient is aware of aftercare treatment options here at [facility] for
substance use and mental health. Christy Lee, LCSW, and myself also offered to 
help him with other resources. He is refusing all assistance at this time, and 
states that he would like to "find a doctor who could give me something," but is
not more specific when asked about what he expects. 
 
He expects to make up his two English classes at [facility].
 
He denies using alcohol, cannabis or other drugs to self-medicate for anxiety, 
panic, depression or to block out memory of trauma. His use of substances is 
recreational, and we discussed some of the health risks of their use. He states 
that he would get a marijuana card if he could.
 
He has received 3 doses of lorazepam 2 mg in the last 24 hours.
CIWA as of 4/10 @ 2000: 6-4-8-6-0-0-7-0-0
 
CK: 1048 on 4/10/18; 584 on 4/11/18.
Also on 4/11/18: magnesium 1.5L, calcium 8.0L
Suggestion:
1. Please reduce lorazepam as much as possible, by tapering at a rate of 20% 
daily reduction. The patient denies using these as an outpatient and is a low 
risk for a withdrawal seizure.
 
2. Replete electrolytes, as indicated.
 
3. Continue to encourage the patient to seek outpatient psychiatry and 
counseling for substance abuse, family discord and possible anxiety/social 
anxiety.
 
4. Continue to discourage the use of alcohol, cannabis and other street drugs.
 
the patient is not delirious, not psychotic and not suicidal.
He is clear to discharge from a psychiatric viewpoint.
Thank you for this consult.
Psychiatry is signing off.
 
 
Subjective
Subjective:
Sleep and oriented to person, place, day, month and year.
Denies AH, VH and presetns no alis delusions
Denies SI or HI
Denies depressive feelings, hopelessness, helplessness, worthlessness, guilty 
feelings.
Anxiety 1/10; 10/10 is the worst
He reports a history of "not wanting to go into the store; I'd sit in my car," 
but reports he has worked through this by self-directed exposure therapy.
 
 
 
Review of Systems
Neurological/Psychological:
Reports: anxiety. 
 
Objective
Last 24 Hrs of Vital Signs/I&O
 Vital Signs
 
 
Date Time Temp Pulse Resp B/P B/P Pulse O2 O2 Flow FiO2
 
     Mean Ox Delivery Rate 
 
04/11 0000      96 Room Air  
 
04/10 2300 98.9 63 16 146/82  96 Room Air  
 
04/10 1600 98.2 100 20 140/88     
 
04/10 1600 98.2 100 20 140/88  98 Room Air  
 
04/10 1200 98.9 94 20 150/88     
 
 
 Intake & Output
 
 
 04/11 1600 04/11 0800 04/11 0000
 
Intake Total  1265 1821
 
Output Total  1 
 
Balance  1264 1821
 
    
 
Intake, IV  1145 1321
 
Intake, Oral  120 500
 
Number  0 0
 
Bowel   
 
Movements   
 
Output, Urine  1 
 
Patient 139 lb  
 
Weight   
 
 
 
Physical Exam:
Not performed
 
Physical Exam
General Appearance: no apparent distress, comfortable, Sleepy, and wants to 
sleep until his propsed discharge later today.
Neurologic/Psychiatric: oriented x 3
Current Medications:
 Current Medications
 
 
  Sig/Bianca Start time  Last
 
Medication Dose Route Stop Time Status Admin
 
Folic Acid 1 MG DAILY 04/10 1000 AC 04/10
 
  PO   0852
 
Heparin Sodium  5,000 UNIT Q8 04/08 0600 AC 04/10
 
(Porcine)  SC   0503
 
Lorazepam 2 MG TID PRN 04/10 0845 AC 04/10
 
  PO   1858
 
Lorazepam 2 MG Q6-PRN PRN 04/09 2300 AC 04/10
 
  IV   2250
 
Multivitamins 1 TAB DAILY 04/10 1000 AC 04/10
 
  PO   0852
 
Nicotine 2 MG Q2 HRS AS NEEDED PRN 04/09 2300 AC 04/10
 
  PO   1801
 
Nicotine 14 MG DAILY 04/09 1800 AC 04/10
 
  TOP   0850
 
Ramelteon 8 MG QPM 04/10 2200 AC 04/10
 
  PO   2250
 
Sodium Chloride 1,000 ML Q6H 04/09 1115 AC 04/11
 
  IV   0058
 
Thiamine HCl 100 MG DAILY 04/10 1000 AC 04/10
 
  PO   0852
 
 
 
 
Results
Last 24 Hrs of Labs/Mics:
 Laboratory Tests
 
 
 04/11
 
 0704
 
Chemistry 
 
  Sodium (137 - 145 mmol/L) 140
 
  Potassium (3.5 - 5.1 mmol/L) 3.8
 
  Chloride (98 - 107 mmol/L) 109  H
 
  Carbon Dioxide (22 - 30 mmol/L) 23
 
  Anion Gap (5 - 16) 9
 
  BUN (9 - 20 mg/dL) 11
 
  Creatinine (0.7 - 1.2 mg/dL) 0.7
 
  Estimated GFR (>60 ml/min) > 60
 
  Glucose (65 - 99 mg/dL) 89
 
  Calcium (8.4 - 10.2 mg/dL) 8.0  L
 
  Phosphorus (2.5 - 4.5 mg/dL) 3.9
 
  Magnesium (1.6 - 2.3 mg/dL) 1.5  L
 
  Total Bilirubin (0.2 - 1.3 mg/dL) 0.3
 
  AST (17 - 59 U/L) 26
 
  ALT (21 - 72 U/L) 35
 
  Creatine Kinase (55 - 170 U/L) 584  H
 
  Albumin (3.5 - 5.0 g/dL) 3.0  L

## 2018-04-11 NOTE — DISCHARGE SUMMARY
Visit Information
 
Visit Dates
Admission Date:
04/08/18
 
 
Hospital Course
Allergies:
Coded Allergies:
tree nut (Severe, ANAPHYLAXIS 01/13/18)
 
 
Discharge Instructions
 
Medications at Discharge
Discharge Medications:
Start taking the following new medications:
Folic Acid (Folic Acid) 1 MG TABLET
    1 Milligram ORAL DAILY
    Qty = 30
    Refills = 1
    Comments:
       Last Taken: 4/11/18
             Time: 1200
 
Thiamine HCl (Vitamin B-1) 100 MG TABLET
    100 Milligram ORAL DAILY
    Qty = 30
    Refills = 1
 
Multivitamin (One Daily Multivitamin) 1 EACH TABLET
    1 Tablet ORAL DAILY
    Qty = 30
    Refills = 1

## 2018-04-11 NOTE — PATIENT DISCHARGE INSTRUCTIONS
Discharge Instructions
 
General Discharge Information
You were seen/treated for:
- Substance abuse
Watch for these problems:
#1 chest pain, shortness of breath
#2 change in mentation, confusion
 
Special Instructions:
-Please see your primary care provider within a week of discharge. 
-Please follow up with your psychiatrist within a week of discharge.
-Please take your medications as prescribed. 
 
Acute Coronary Syndrome
 
Inclusion Criteria
At DC or during hospital stay patient has or had the following:
ACS DIAGNOSIS No
 
Discharge Core Measures
Meds if any: Prescribed or Continued at Discharge
Meds if any: NOT Prescribed or Continued at Discharge
 
Congestive Heart Failure
 
Inclusion Criteria
At DC or during hospital stay patient has or had the following:
CHF DIAGNOSIS No
 
Discharge Core Measures
Meds if any: Prescribed or Continued at Discharge
Meds if any: NOT Prescribed or Continued at Discharge
 
Cerebrovascular accident
 
Inclusion Criteria
At DC or during hospital stay patient has or had the following:
CVA/TIA Diagnosis No
 
Discharge Core Measures
Meds if any: Prescribed or Continued at Discharge
Meds if any: NOT Prescribed or Continued at Discharge
 
Venous thromboembolism
 
Inclusion Criteria
VTE Diagnosis No
VTE Type NONE
VTE Confirmed by (Test) NONE
 
Discharge Core Measures
- Per Current guidelines, there needs to be overlap
- treatment for the first 5 days of Warfarin therapy.
- If discharged on Warfarin prior to 5 days of
- overlap therapy, the patient will need to be
- assessed for post discharge needs including
- *Post discharge parental anticoagulation
- *Warfarin and/or parental anticoagulation education
- *Follow up date to check INR post discharge
At least 5 days overlap therapy as Inpatient No
Meds if any: Prescribed or Continued at Discharge
Note: Overlap Therapy is Warfarin and Anticoagulant
Meds if any: NOT Prescribed or Continued at Discharge

## 2018-09-21 ENCOUNTER — HOSPITAL ENCOUNTER (EMERGENCY)
Dept: HOSPITAL 68 - ERH | Age: 23
LOS: 1 days | End: 2018-09-22
Payer: COMMERCIAL

## 2018-09-21 VITALS — HEIGHT: 72 IN | WEIGHT: 190 LBS | BODY MASS INDEX: 25.73 KG/M2

## 2018-09-21 DIAGNOSIS — F41.9: ICD-10-CM

## 2018-09-21 DIAGNOSIS — F29: ICD-10-CM

## 2018-09-21 DIAGNOSIS — F14.10: Primary | ICD-10-CM

## 2018-09-21 DIAGNOSIS — F12.90: ICD-10-CM

## 2018-09-21 PROCEDURE — G0480 DRUG TEST DEF 1-7 CLASSES: HCPCS

## 2018-09-21 PROCEDURE — G0463 HOSPITAL OUTPT CLINIC VISIT: HCPCS

## 2018-09-22 VITALS — SYSTOLIC BLOOD PRESSURE: 138 MMHG | DIASTOLIC BLOOD PRESSURE: 72 MMHG

## 2018-09-22 LAB
ABSOLUTE GRANULOCYTE CT: 5 /CUMM (ref 1.4–6.5)
BASOPHILS # BLD: 0 /CUMM (ref 0–0.2)
BASOPHILS NFR BLD: 0.5 % (ref 0–2)
EOSINOPHIL # BLD: 0.1 /CUMM (ref 0–0.7)
EOSINOPHIL NFR BLD: 1.1 % (ref 0–5)
ERYTHROCYTE [DISTWIDTH] IN BLOOD BY AUTOMATED COUNT: 13 % (ref 11.5–14.5)
GRANULOCYTES NFR BLD: 56.8 % (ref 42.2–75.2)
HCT VFR BLD CALC: 42.1 % (ref 42–52)
LYMPHOCYTES # BLD: 2.6 /CUMM (ref 1.2–3.4)
MCH RBC QN AUTO: 29.5 PG (ref 27–31)
MCHC RBC AUTO-ENTMCNC: 34 G/DL (ref 33–37)
MCV RBC AUTO: 86.9 FL (ref 80–94)
MONOCYTES # BLD: 1 /CUMM (ref 0.1–0.6)
PLATELET # BLD: 321 /CUMM (ref 130–400)
PMV BLD AUTO: 7.1 FL (ref 7.4–10.4)
RED BLOOD CELL CT: 4.85 /CUMM (ref 4.7–6.1)
WBC # BLD AUTO: 8.7 /CUMM (ref 4.8–10.8)
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
